# Patient Record
Sex: FEMALE | Race: BLACK OR AFRICAN AMERICAN | NOT HISPANIC OR LATINO | URBAN - METROPOLITAN AREA
[De-identification: names, ages, dates, MRNs, and addresses within clinical notes are randomized per-mention and may not be internally consistent; named-entity substitution may affect disease eponyms.]

---

## 2017-05-16 ENCOUNTER — EMERGENCY (EMERGENCY)
Facility: HOSPITAL | Age: 52
LOS: 1 days | Discharge: HOME | End: 2017-05-16
Attending: EMERGENCY MEDICINE | Admitting: EMERGENCY MEDICINE
Payer: COMMERCIAL

## 2017-05-16 VITALS
TEMPERATURE: 98 F | RESPIRATION RATE: 16 BRPM | HEART RATE: 107 BPM | DIASTOLIC BLOOD PRESSURE: 61 MMHG | OXYGEN SATURATION: 99 % | SYSTOLIC BLOOD PRESSURE: 96 MMHG
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
## 2017-05-16 DIAGNOSIS — S80.01XA CONTUSION OF RIGHT KNEE, INITIAL ENCOUNTER: ICD-10-CM

## 2017-05-16 DIAGNOSIS — M25.561 PAIN IN RIGHT KNEE: ICD-10-CM
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
## 2017-05-16 PROCEDURE — 99283 EMERGENCY DEPT VISIT LOW MDM: CPT

## 2017-05-16 PROCEDURE — 73562 X-RAY EXAM OF KNEE 3: CPT | Mod: 26,RT

## 2017-05-16 RX ORDER — INDOMETHACIN 50 MG
25 CAPSULE ORAL ONCE
Qty: 0 | Refills: 0 | Status: COMPLETED | OUTPATIENT
Start: 2017-05-16 | End: 2017-05-16

## 2017-05-16 RX ADMIN — Medication 25 MILLIGRAM(S): at 15:10

## 2017-05-16 NOTE — ED ADULT NURSE NOTE - OBJECTIVE STATEMENT
Patient to ED with complaint of right knee pain and that her leg gave out today while walking.  Appears comfortable and in no distress

## 2017-05-16 NOTE — ED PROVIDER NOTE - NS ED MD SCRIBE ATTENDING SCRIBE SECTIONS
RESULTS/PROGRESS NOTE/PAST MEDICAL/SURGICAL/SOCIAL HISTORY/REVIEW OF SYSTEMS/VITAL SIGNS( Pullset)/PHYSICAL EXAM/HIV/HISTORY OF PRESENT ILLNESS/DISPOSITION/INTAKE ASSESSMENT/SCREENINGS

## 2017-05-16 NOTE — ED PROVIDER NOTE - MEDICAL DECISION MAKING DETAILS
Concern for ligamentous injury, fracture, or dislocation. Diff dx includes ligamentous injury, fracture, or dislocation, contusion.

## 2017-05-16 NOTE — ED PROVIDER NOTE - MUSCULOSKELETAL, MLM
Minor point tenderness over right mid-patella. Negative [facility] sign, negative drawer test, negative Apley sign. Minor point tenderness over right mid-patella. Negative [facility] sign, negative drawer test, negative [facility] sign.

## 2017-05-16 NOTE — ED PROVIDER NOTE - OBJECTIVE STATEMENT
51y F presents for R knee pain after stating that her leg gave out while walking at work today, causing her to fall on her knee. Reports having a similar fall 2 weeks ago. Denies fever, chills.

## 2024-03-19 ENCOUNTER — OFFICE (OUTPATIENT)
Dept: URBAN - METROPOLITAN AREA CLINIC 92 | Facility: CLINIC | Age: 59
Setting detail: OPHTHALMOLOGY
End: 2024-03-19
Payer: COMMERCIAL

## 2024-03-19 DIAGNOSIS — H40.013: ICD-10-CM

## 2024-03-19 PROCEDURE — 76514 ECHO EXAM OF EYE THICKNESS: CPT | Performed by: OPHTHALMOLOGY

## 2024-03-19 PROCEDURE — 92083 EXTENDED VISUAL FIELD XM: CPT | Performed by: OPHTHALMOLOGY

## 2024-03-19 PROCEDURE — 92004 COMPRE OPH EXAM NEW PT 1/>: CPT | Performed by: OPHTHALMOLOGY

## 2024-03-19 PROCEDURE — 92020 GONIOSCOPY: CPT | Performed by: OPHTHALMOLOGY

## 2024-03-19 PROCEDURE — 92133 CPTRZD OPH DX IMG PST SGM ON: CPT | Performed by: OPHTHALMOLOGY

## 2024-03-19 ASSESSMENT — REFRACTION_CURRENTRX
OS_OVR_VA: 20/
OS_AXIS: 096
OD_OVR_VA: 20/
OD_CYLINDER: -1.25
OD_AXIS: 119
OS_CYLINDER: -2.55
OD_SPHERE: -1.25
OS_SPHERE: -1.00

## 2025-02-20 ENCOUNTER — OFFICE (OUTPATIENT)
Dept: URBAN - METROPOLITAN AREA CLINIC 28 | Facility: CLINIC | Age: 60
Setting detail: OPHTHALMOLOGY
End: 2025-02-20
Payer: COMMERCIAL

## 2025-02-20 DIAGNOSIS — H40.013: ICD-10-CM

## 2025-02-20 PROCEDURE — 92133 CPTRZD OPH DX IMG PST SGM ON: CPT | Performed by: OPHTHALMOLOGY

## 2025-02-20 PROCEDURE — 92012 INTRM OPH EXAM EST PATIENT: CPT | Performed by: OPHTHALMOLOGY

## 2025-02-20 ASSESSMENT — REFRACTION_AUTOREFRACTION
OS_SPHERE: -1.25
OS_AXIS: 079
OD_SPHERE: -0.25
OD_AXIS: 084
OS_CYLINDER: -1.50
OD_CYLINDER: -2.25

## 2025-02-20 ASSESSMENT — REFRACTION_CURRENTRX
OD_SPHERE: -1.25
OD_OVR_VA: 20/
OS_CYLINDER: -2.00
OS_OVR_VA: 20/
OS_SPHERE: -1.25
OD_AXIS: 119
OS_AXIS: 096
OD_SPHERE: -0.75
OS_SPHERE: -1.00
OS_OVR_VA: 20/
OS_AXIS: 084
OD_CYLINDER: -1.75
OD_AXIS: 102
OD_CYLINDER: -1.25
OD_OVR_VA: 20/
OS_CYLINDER: -2.55

## 2025-02-20 ASSESSMENT — VISUAL ACUITY
OD_BCVA: 20/20
OS_BCVA: 20/20

## 2025-02-20 ASSESSMENT — KERATOMETRY
OS_AXISANGLE_DEGREES: 180
OD_K1POWER_DIOPTERS: 42.75
OS_K1POWER_DIOPTERS: 43.00
OD_K2POWER_DIOPTERS: 43.50
OD_AXISANGLE_DEGREES: 173
OS_K2POWER_DIOPTERS: 43.50

## 2025-02-20 ASSESSMENT — PACHYMETRY
OD_CT_CORRECTION: -5
OS_CT_CORRECTION: -4
OS_CT_UM: 592
OD_CT_UM: 615

## 2025-02-20 ASSESSMENT — CONFRONTATIONAL VISUAL FIELD TEST (CVF)
OS_FINDINGS: FULL
OD_FINDINGS: FULL

## 2025-02-20 ASSESSMENT — TONOMETRY: OD_IOP_MMHG: 18

## 2025-08-07 ENCOUNTER — OFFICE (OUTPATIENT)
Dept: URBAN - METROPOLITAN AREA CLINIC 28 | Facility: CLINIC | Age: 60
Setting detail: OPHTHALMOLOGY
End: 2025-08-07
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
## 2025-08-07 PROCEDURE — NO SHOW FE NO SHOW FEE: Performed by: OPHTHALMOLOGY
